# Patient Record
Sex: MALE | Race: WHITE | NOT HISPANIC OR LATINO | URBAN - METROPOLITAN AREA
[De-identification: names, ages, dates, MRNs, and addresses within clinical notes are randomized per-mention and may not be internally consistent; named-entity substitution may affect disease eponyms.]

---

## 2023-10-07 PROBLEM — J35.1 TONSILLAR HYPERTROPHY, UNILATERAL: Status: ACTIVE | Noted: 2023-10-07

## 2023-10-07 PROBLEM — L05.91 INFECTED PILONIDAL CYST: Status: ACTIVE | Noted: 2023-10-07

## 2023-10-07 PROBLEM — L73.2 HIDRADENITIS SUPPURATIVA: Status: ACTIVE | Noted: 2023-07-25

## 2023-10-07 PROBLEM — L70.0 ACNE VULGARIS: Status: ACTIVE | Noted: 2023-10-07

## 2023-10-07 PROBLEM — N50.9 SCROTAL SKIN LESION: Status: ACTIVE | Noted: 2023-10-07

## 2023-10-07 RX ORDER — DOXYCYCLINE HYCLATE 100 MG
TABLET ORAL
COMMUNITY
Start: 2023-05-10 | End: 2023-10-11 | Stop reason: ALTCHOICE

## 2023-10-07 RX ORDER — ADALIMUMAB 40MG/0.4ML
KIT SUBCUTANEOUS
COMMUNITY
Start: 2023-05-16

## 2023-10-07 RX ORDER — ADALIMUMAB 80MG/0.8ML
KIT SUBCUTANEOUS
COMMUNITY
Start: 2023-05-16

## 2023-10-11 ENCOUNTER — OFFICE VISIT (OUTPATIENT)
Dept: DERMATOLOGY | Facility: CLINIC | Age: 23
End: 2023-10-11
Payer: COMMERCIAL

## 2023-10-11 DIAGNOSIS — L73.2 HIDRADENITIS SUPPURATIVA: Primary | ICD-10-CM

## 2023-10-11 PROCEDURE — 99213 OFFICE O/P EST LOW 20 MIN: CPT | Performed by: DERMATOLOGY

## 2023-10-11 PROCEDURE — 1036F TOBACCO NON-USER: CPT | Performed by: DERMATOLOGY

## 2023-10-11 ASSESSMENT — DERMATOLOGY QUALITY OF LIFE (QOL) ASSESSMENT
WHAT SINGLE SKIN CONDITION LISTED BELOW IS THE PATIENT ANSWERING THE QUALITY-OF-LIFE ASSESSMENT QUESTIONS ABOUT: HIDRADENITIS SUPPURATIVA
ARE THERE EXCLUSIONS OR EXCEPTIONS FOR THE QUALITY OF LIFE ASSESSMENT: NO
RATE HOW BOTHERED YOU ARE BY EFFECTS OF YOUR SKIN PROBLEMS ON YOUR ACTIVITIES (EG, GOING OUT, ACCOMPLISHING WHAT YOU WANT, WORK ACTIVITIES OR YOUR RELATIONSHIPS WITH OTHERS): 1
RATE HOW EMOTIONALLY BOTHERED YOU ARE BY YOUR SKIN PROBLEM (FOR EXAMPLE, WORRY, EMBARRASSMENT, FRUSTRATION): 2
RATE HOW BOTHERED YOU ARE BY SYMPTOMS OF YOUR SKIN PROBLEM (EG, ITCHING, STINGING BURNING, HURTING OR SKIN IRRITATION): 2

## 2023-10-11 ASSESSMENT — DERMATOLOGY PATIENT ASSESSMENT
DO YOU USE SUNSCREEN: OCCASIONALLY
FOR PATIENTS COMING IN FOR A FOLLOW-UP VISIT - HAVE THERE BEEN ANY CHANGES IN YOUR HEALTH SINCE YOUR LAST VISIT: NO
DO YOU USE A TANNING BED: NO
ARE YOU AN ORGAN TRANSPLANT RECIPIENT: NO
DO YOU HAVE ANY NEW OR CHANGING LESIONS: NO

## 2023-10-11 ASSESSMENT — ITCH NUMERIC RATING SCALE: HOW SEVERE IS YOUR ITCHING?: 0

## 2023-10-11 ASSESSMENT — PATIENT GLOBAL ASSESSMENT (PGA): PATIENT GLOBAL ASSESSMENT: PATIENT GLOBAL ASSESSMENT:  3 - MODERATE

## 2023-10-12 NOTE — PROGRESS NOTES
Subjective     Kvng Vieyra is a 23 y.o. male who presents for the following: Hidradenitis Suppurativa (Pt here today following up on HS. Located in groin. Current treatment Humira 40mg every week. Improved since last visit. Last labs and tspot 5/2023.).  He admits that he is not always consistent with the timing of his dosing of Humira and most recently developed Covid and reports in the past prior to being on Humira he recovered quickly, it took him much longer this time despite holding his dose to recover and feel better.  He otherwise is tolerated Humira well and reports no new lesions or flares since starting Humira.    Review of Systems:  No other skin or systemic complaints other than what is documented elsewhere in the note.    The following portions of the chart were reviewed this encounter and updated as appropriate:          Skin Cancer History  No skin cancer on file.      Specialty Problems          Dermatology Problems    Hidradenitis suppurativa    Acne vulgaris        Objective   Well appearing patient in no apparent distress; mood and affect are within normal limits.    A focused skin examination was performed. All findings within normal limits unless otherwise noted below.    Assessment/Plan   1. Hidradenitis suppurativa  Left Scrotum, Mons Pubis, Right Scrotum  Pink macules and scars in the suprapubic area and proximal thighs.  Scortum has slight erythema. Otherwise clear    Hidradenitis suppurativa is a chronic and intermittently flaring condition that can occur in multiple areas including the axilla, beneath the breasts, groin and/or buttock.  There is no cure for this condition, but it can be controlled.  The goal of therapy is to limit the severity and frequency of outbreaks.  Treatments typically include a combination of topical medications with or without oral medications.     Given the substantial improvement on Humira I did encourage him to continue taking humira 40mg weekly. He states he  may trial and do it every other week or once monthly, however advised he may notice that he begins to falre.    Yes, Humira does suppress the immune system, so increased risks of infection and taking longer to recover is expected with this medication. It is a risk/benefit as far as control of his HS and overall health.    Discontinuing Humira will likely result in recurrence, however the timeline of which this occurs may vary.    In addition there is no guarantee that if discontinued and later restarted due to recurrence that Humira will be as effective as it was previously.    Follow up in 6 months.    Follow Up In Dermatology - Established Patient - Left Scrotum, Mons Pubis, Right Scrotum

## 2024-04-11 ENCOUNTER — APPOINTMENT (OUTPATIENT)
Dept: DERMATOLOGY | Facility: CLINIC | Age: 24
End: 2024-04-11
Payer: COMMERCIAL

## 2025-01-02 ENCOUNTER — APPOINTMENT (OUTPATIENT)
Dept: DERMATOLOGY | Facility: CLINIC | Age: 25
End: 2025-01-02
Payer: COMMERCIAL

## 2025-01-02 DIAGNOSIS — L73.2 HIDRADENITIS SUPPURATIVA: Primary | ICD-10-CM

## 2025-01-02 DIAGNOSIS — L73.9 FOLLICULITIS: ICD-10-CM

## 2025-01-02 PROCEDURE — 99213 OFFICE O/P EST LOW 20 MIN: CPT | Performed by: DERMATOLOGY

## 2025-01-02 RX ORDER — CLINDAMYCIN PHOSPHATE 10 UG/ML
LOTION TOPICAL
Qty: 60 ML | Refills: 11 | Status: SHIPPED | OUTPATIENT
Start: 2025-01-02

## 2025-01-02 ASSESSMENT — DERMATOLOGY PATIENT ASSESSMENT
DO YOU HAVE ANY NEW OR CHANGING LESIONS: NO
ARE YOU AN ORGAN TRANSPLANT RECIPIENT: NO
DO YOU USE SUNSCREEN: OCCASIONALLY
DO YOU USE A TANNING BED: NO
FOR PATIENTS COMING IN FOR A FOLLOW-UP VISIT - HAVE THERE BEEN ANY CHANGES IN YOUR HEALTH SINCE YOUR LAST VISIT: ALL IN MYCHART

## 2025-01-02 ASSESSMENT — DERMATOLOGY QUALITY OF LIFE (QOL) ASSESSMENT
RATE HOW BOTHERED YOU ARE BY EFFECTS OF YOUR SKIN PROBLEMS ON YOUR ACTIVITIES (EG, GOING OUT, ACCOMPLISHING WHAT YOU WANT, WORK ACTIVITIES OR YOUR RELATIONSHIPS WITH OTHERS): 6 - ALWAYS BOTHERED
RATE HOW BOTHERED YOU ARE BY SYMPTOMS OF YOUR SKIN PROBLEM (EG, ITCHING, STINGING BURNING, HURTING OR SKIN IRRITATION): 6 - ALWAYS BOTHERED
WHAT SINGLE SKIN CONDITION LISTED BELOW IS THE PATIENT ANSWERING THE QUALITY-OF-LIFE ASSESSMENT QUESTIONS ABOUT: HIDRADENITIS SUPPURATIVA
ARE THERE EXCLUSIONS OR EXCEPTIONS FOR THE QUALITY OF LIFE ASSESSMENT: NO
RATE HOW EMOTIONALLY BOTHERED YOU ARE BY YOUR SKIN PROBLEM (FOR EXAMPLE, WORRY, EMBARRASSMENT, FRUSTRATION): 6 - ALWAYS BOTHERED

## 2025-01-02 ASSESSMENT — ITCH NUMERIC RATING SCALE: HOW SEVERE IS YOUR ITCHING?: 0

## 2025-01-02 ASSESSMENT — PATIENT GLOBAL ASSESSMENT (PGA): PATIENT GLOBAL ASSESSMENT: PATIENT GLOBAL ASSESSMENT:  3 - MODERATE

## 2025-01-02 NOTE — PROGRESS NOTES
Subjective     Kvng Vieyra is a 24 y.o. male who presents for the following: Hidradenitis Suppurativa (Pt here today following up on Hidradenitis suppurativa, located in groin. Previously treated with Humira 40mg every week-last injection June 2024. Not flaring today. Didn't feel Humira helped, [facility] notes states Humira did help. Pt states he changed diet and that improved HS.).     Last appt 10/2023 he was improved on Humira substantially.     Review of Systems:  No other skin or systemic complaints other than what is documented elsewhere in the note.    The following portions of the chart were reviewed this encounter and updated as appropriate:          Skin Cancer History  No skin cancer on file.      Specialty Problems          Dermatology Problems    Hidradenitis suppurativa    Acne vulgaris        Objective   Well appearing patient in no apparent distress; mood and affect are within normal limits.    A focused skin examination was performed of the face, left thigh. Declined examination of the groin. All findings within normal limits unless otherwise noted below.    Assessment/Plan   1. Hidradenitis suppurativa  Left Scrotum, Mons Pubis, Right Scrotum  Declines examination today    Hidradenitis suppurativa is a chronic and intermittently flaring condition that can occur in multiple areas including the axilla, beneath the breasts, groin and/or buttock.  There is no cure for this condition, but it can be controlled.  The goal of therapy is to limit the severity and frequency of outbreaks.  Treatments typically include a combination of topical medications with or without oral medications.     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    Continue Hibiclens  and can use clindamycin 1% lotion 2x daily as needed.    Follow up if worsening - can consider IL17 inhibitor vs. Clinical trial    2. Folliculitis  Left Thigh - Anterior  Solitary follicular based crusted papule    Folliculitis is an inflammatory condition of the hair follicles that often results in itchy and/or painful raised bumps.  It is often due to normal skin bacteria as well as normal fungus (yeast) on the skin.  The treatment options include both topical and systemic (oral) therapy.    Continue Hibiclens wash daily  Start clindamycin 1% lotion 2x daily to affected area    Follow up as needed.    clindamycin (Cleocin T) 1 % lotion - Left Thigh - Anterior  Apply to affected area 2x daily as needed        Follow up as needed

## 2025-01-17 ENCOUNTER — TELEPHONE (OUTPATIENT)
Dept: DERMATOLOGY | Facility: CLINIC | Age: 25
End: 2025-01-17
Payer: COMMERCIAL

## 2025-01-17 NOTE — TELEPHONE ENCOUNTER
Pt LM inquiring about if his office visit was submitted to [facility] yet. I returned call and LM provided pt with billing customer service number.